# Patient Record
Sex: FEMALE | Race: WHITE | NOT HISPANIC OR LATINO | URBAN - METROPOLITAN AREA
[De-identification: names, ages, dates, MRNs, and addresses within clinical notes are randomized per-mention and may not be internally consistent; named-entity substitution may affect disease eponyms.]

---

## 2019-03-03 ENCOUNTER — INPATIENT (INPATIENT)
Facility: HOSPITAL | Age: 24
LOS: 0 days | Discharge: LEFT BEFORE DISCHARGE | DRG: 872 | End: 2019-03-04
Attending: INTERNAL MEDICINE | Admitting: INTERNAL MEDICINE
Payer: COMMERCIAL

## 2019-03-03 VITALS
WEIGHT: 126.1 LBS | HEIGHT: 65 IN | OXYGEN SATURATION: 100 % | HEART RATE: 136 BPM | SYSTOLIC BLOOD PRESSURE: 126 MMHG | TEMPERATURE: 99 F | DIASTOLIC BLOOD PRESSURE: 74 MMHG | RESPIRATION RATE: 16 BRPM

## 2019-03-03 DIAGNOSIS — Z98.890 OTHER SPECIFIED POSTPROCEDURAL STATES: Chronic | ICD-10-CM

## 2019-03-03 LAB
ACETONE SERPL-MCNC: NEGATIVE — SIGNIFICANT CHANGE UP
ALBUMIN SERPL ELPH-MCNC: 3.5 G/DL — SIGNIFICANT CHANGE UP (ref 3.5–5)
ALP SERPL-CCNC: 68 U/L — SIGNIFICANT CHANGE UP (ref 40–120)
ALT FLD-CCNC: 27 U/L DA — SIGNIFICANT CHANGE UP (ref 10–60)
ANION GAP SERPL CALC-SCNC: 8 MMOL/L — SIGNIFICANT CHANGE UP (ref 5–17)
APPEARANCE UR: CLEAR — SIGNIFICANT CHANGE UP
AST SERPL-CCNC: 17 U/L — SIGNIFICANT CHANGE UP (ref 10–40)
BASOPHILS # BLD AUTO: 0.04 K/UL — SIGNIFICANT CHANGE UP (ref 0–0.2)
BASOPHILS NFR BLD AUTO: 0.2 % — SIGNIFICANT CHANGE UP (ref 0–2)
BILIRUB SERPL-MCNC: 0.2 MG/DL — SIGNIFICANT CHANGE UP (ref 0.2–1.2)
BILIRUB UR-MCNC: NEGATIVE — SIGNIFICANT CHANGE UP
BUN SERPL-MCNC: 7 MG/DL — SIGNIFICANT CHANGE UP (ref 7–18)
CALCIUM SERPL-MCNC: 8.5 MG/DL — SIGNIFICANT CHANGE UP (ref 8.4–10.5)
CHLORIDE SERPL-SCNC: 110 MMOL/L — HIGH (ref 96–108)
CO2 SERPL-SCNC: 24 MMOL/L — SIGNIFICANT CHANGE UP (ref 22–31)
COLOR SPEC: YELLOW — SIGNIFICANT CHANGE UP
CREAT SERPL-MCNC: 0.65 MG/DL — SIGNIFICANT CHANGE UP (ref 0.5–1.3)
DIFF PNL FLD: NEGATIVE — SIGNIFICANT CHANGE UP
EOSINOPHIL # BLD AUTO: 0 K/UL — SIGNIFICANT CHANGE UP (ref 0–0.5)
EOSINOPHIL NFR BLD AUTO: 0 % — SIGNIFICANT CHANGE UP (ref 0–6)
ERYTHROCYTE [SEDIMENTATION RATE] IN BLOOD: 29 MM/HR — HIGH (ref 0–15)
GLUCOSE SERPL-MCNC: 117 MG/DL — HIGH (ref 70–99)
GLUCOSE UR QL: NEGATIVE — SIGNIFICANT CHANGE UP
HCG SERPL-ACNC: <1 MIU/ML — SIGNIFICANT CHANGE UP
HCT VFR BLD CALC: 36.9 % — SIGNIFICANT CHANGE UP (ref 34.5–45)
HGB BLD-MCNC: 11.9 G/DL — SIGNIFICANT CHANGE UP (ref 11.5–15.5)
IMM GRANULOCYTES NFR BLD AUTO: 0.7 % — SIGNIFICANT CHANGE UP (ref 0–1.5)
KETONES UR-MCNC: NEGATIVE — SIGNIFICANT CHANGE UP
LACTATE SERPL-SCNC: 1.8 MMOL/L — SIGNIFICANT CHANGE UP (ref 0.7–2)
LEUKOCYTE ESTERASE UR-ACNC: NEGATIVE — SIGNIFICANT CHANGE UP
LYMPHOCYTES # BLD AUTO: 1 K/UL — SIGNIFICANT CHANGE UP (ref 1–3.3)
LYMPHOCYTES # BLD AUTO: 4.9 % — LOW (ref 13–44)
MAGNESIUM SERPL-MCNC: 2.1 MG/DL — SIGNIFICANT CHANGE UP (ref 1.6–2.6)
MCHC RBC-ENTMCNC: 28.8 PG — SIGNIFICANT CHANGE UP (ref 27–34)
MCHC RBC-ENTMCNC: 32.2 GM/DL — SIGNIFICANT CHANGE UP (ref 32–36)
MCV RBC AUTO: 89.3 FL — SIGNIFICANT CHANGE UP (ref 80–100)
MONOCYTES # BLD AUTO: 0.48 K/UL — SIGNIFICANT CHANGE UP (ref 0–0.9)
MONOCYTES NFR BLD AUTO: 2.3 % — SIGNIFICANT CHANGE UP (ref 2–14)
NEUTROPHILS # BLD AUTO: 18.89 K/UL — HIGH (ref 1.8–7.4)
NEUTROPHILS NFR BLD AUTO: 91.9 % — HIGH (ref 43–77)
NITRITE UR-MCNC: NEGATIVE — SIGNIFICANT CHANGE UP
NRBC # BLD: 0 /100 WBCS — SIGNIFICANT CHANGE UP (ref 0–0)
PH UR: 6 — SIGNIFICANT CHANGE UP (ref 5–8)
PLATELET # BLD AUTO: 453 K/UL — HIGH (ref 150–400)
POTASSIUM SERPL-MCNC: 3.4 MMOL/L — LOW (ref 3.5–5.3)
POTASSIUM SERPL-SCNC: 3.4 MMOL/L — LOW (ref 3.5–5.3)
PROT SERPL-MCNC: 8.1 G/DL — SIGNIFICANT CHANGE UP (ref 6–8.3)
PROT UR-MCNC: NEGATIVE — SIGNIFICANT CHANGE UP
RBC # BLD: 4.13 M/UL — SIGNIFICANT CHANGE UP (ref 3.8–5.2)
RBC # FLD: 14.2 % — SIGNIFICANT CHANGE UP (ref 10.3–14.5)
SODIUM SERPL-SCNC: 142 MMOL/L — SIGNIFICANT CHANGE UP (ref 135–145)
SP GR SPEC: 1.01 — SIGNIFICANT CHANGE UP (ref 1.01–1.02)
T3 SERPL-MCNC: 98 NG/DL — SIGNIFICANT CHANGE UP (ref 80–200)
TSH SERPL-MCNC: 0.81 UU/ML — SIGNIFICANT CHANGE UP (ref 0.34–4.82)
UROBILINOGEN FLD QL: NEGATIVE — SIGNIFICANT CHANGE UP
WBC # BLD: 20.55 K/UL — HIGH (ref 3.8–10.5)
WBC # FLD AUTO: 20.55 K/UL — HIGH (ref 3.8–10.5)

## 2019-03-03 RX ORDER — KETOROLAC TROMETHAMINE 30 MG/ML
30 SYRINGE (ML) INJECTION ONCE
Qty: 0 | Refills: 0 | Status: DISCONTINUED | OUTPATIENT
Start: 2019-03-03 | End: 2019-03-03

## 2019-03-03 RX ORDER — SODIUM CHLORIDE 9 MG/ML
1800 INJECTION INTRAMUSCULAR; INTRAVENOUS; SUBCUTANEOUS ONCE
Qty: 0 | Refills: 0 | Status: COMPLETED | OUTPATIENT
Start: 2019-03-03 | End: 2019-03-03

## 2019-03-03 RX ORDER — ACETAMINOPHEN 500 MG
1000 TABLET ORAL ONCE
Qty: 0 | Refills: 0 | Status: COMPLETED | OUTPATIENT
Start: 2019-03-03 | End: 2019-03-04

## 2019-03-03 RX ORDER — PIPERACILLIN AND TAZOBACTAM 4; .5 G/20ML; G/20ML
3.38 INJECTION, POWDER, LYOPHILIZED, FOR SOLUTION INTRAVENOUS ONCE
Qty: 0 | Refills: 0 | Status: COMPLETED | OUTPATIENT
Start: 2019-03-03 | End: 2019-03-03

## 2019-03-03 RX ORDER — POTASSIUM CHLORIDE 20 MEQ
40 PACKET (EA) ORAL ONCE
Qty: 0 | Refills: 0 | Status: COMPLETED | OUTPATIENT
Start: 2019-03-03 | End: 2019-03-03

## 2019-03-03 RX ADMIN — PIPERACILLIN AND TAZOBACTAM 3.38 GRAM(S): 4; .5 INJECTION, POWDER, LYOPHILIZED, FOR SOLUTION INTRAVENOUS at 19:10

## 2019-03-03 RX ADMIN — Medication 30 MILLIGRAM(S): at 23:37

## 2019-03-03 RX ADMIN — PIPERACILLIN AND TAZOBACTAM 200 GRAM(S): 4; .5 INJECTION, POWDER, LYOPHILIZED, FOR SOLUTION INTRAVENOUS at 18:49

## 2019-03-03 RX ADMIN — Medication 0.5 MILLIGRAM(S): at 18:52

## 2019-03-03 RX ADMIN — SODIUM CHLORIDE 1800 MILLILITER(S): 9 INJECTION INTRAMUSCULAR; INTRAVENOUS; SUBCUTANEOUS at 20:12

## 2019-03-03 RX ADMIN — Medication 40 MILLIEQUIVALENT(S): at 21:14

## 2019-03-03 RX ADMIN — SODIUM CHLORIDE 3600 MILLILITER(S): 9 INJECTION INTRAMUSCULAR; INTRAVENOUS; SUBCUTANEOUS at 18:33

## 2019-03-03 NOTE — H&P ADULT - ASSESSMENT
Patient is a 22 y/o F with no PMH and PSH of volvulus and appendectomy was [facility] for sore throat, sepsis and tachycardia. As per patient she acquired sore throat, fever and chills x on february 15 and was test +ve for influenza and Strep throat antigen. Patient was given amoxicillin and Tamiflu. Patient did not take Tamiflu but completed her amoxicillin for 10 days. Patient's symptoms improved but later she developed another sore throat with in 3 days. [facility] went to [facility] again where her HR was 130s and his EKG had some T wave inversions. Patient was then sent in to ED. Patient is currently hemodynamically stable and is not in any distress. Vitals are significant for  /74. Patient received 2 L of IV fluids and a dose of zosyn. Her RVP is negative. UA is clean. D-Dimers are negative. EKG doesn't have any ST-T wave changes except sinus tachycardia. CXR is clear. Her LMP was Feb 13 2019.

## 2019-03-03 NOTE — H&P ADULT - ATTENDING COMMENTS
Patient seen and examined ; case was discussed with the admitting resident    ROS: as in the HPI; all other ROS negative    SH and family history as above    Vital Signs Last 24 Hrs  T(C): 36.7 (03 Mar 2019 23:45), Max: 37.4 (03 Mar 2019 22:40)  T(F): 98 (03 Mar 2019 23:45), Max: 99.4 (03 Mar 2019 22:40)  HR: 124 (03 Mar 2019 23:45) (124 - 136)  BP: 119/63 (03 Mar 2019 23:45) (119/63 - 126/74)  BP(mean): --  RR: 17 (03 Mar 2019 23:45) (16 - 17)  SpO2: 100% (03 Mar 2019 23:45) (99% - 100%)    GEN: NAD  HEENT- normocephalic; mouth moist  CVS- S1S2+  LUNGS- clear to auscultation; no wheezing  ABD: Soft , nontender, nondistended, Bowel sounds are present  EXTREMITY: no calf tenderness, no cyanosis, no edema  NEURO: AAOx3; non focal neurologic exam; cranial nerves grossly intact  PSYCH: normal affect and behavior  BACK: no swelling or mass;   VASCULAR: ++ distal peripheral pulses  SKIN: warm and dry.       Labs Reviewed:                         11.9   20.55 )-----------( 453      ( 03 Mar 2019 18:48 )             36.9     -    142  |  110<H>  |  7   ----------------------------<  117<H>  3.4<L>   |  24  |  0.65    Ca    8.5      03 Mar 2019 18:48  Mg     2.1     -    TPro  8.1  /  Alb  3.5  /  TBili  0.2  /  DBili  x   /  AST  17  /  ALT  27  /  AlkPhos  68          Urinalysis Basic - ( 03 Mar 2019 20:26 )    Color: Yellow / Appearance: Clear / S.010 / pH: x  Gluc: x / Ketone: Negative  / Bili: Negative / Urobili: Negative   Blood: x / Protein: Negative / Nitrite: Negative   Leuk Esterase: Negative / RBC: x / WBC x   Sq Epi: x / Non Sq Epi: x / Bacteria: x        BNP:   MEDICATIONS  (STANDING):  acetaminophen  IVPB .. 1000 milliGRAM(s) IV Intermittent once    MEDICATIONS  (PRN):      CXR reviewed:     EKG Reviewed:         Full H&P to follow      Plan of care discussed with patient ;  all questions and concerns were addressed.  Discussed with Patient's family Patient seen and examined ; case was discussed with the admitting resident    ROS: as in the HPI; all other ROS negative    SH and family history as above    Vital Signs Last 24 Hrs  T(C): 36.7 (03 Mar 2019 23:45), Max: 37.4 (03 Mar 2019 22:40)  T(F): 98 (03 Mar 2019 23:45), Max: 99.4 (03 Mar 2019 22:40)  HR: 124 (03 Mar 2019 23:45) (124 - 136)  BP: 119/63 (03 Mar 2019 23:45) (119/63 - 126/74)  BP(mean): --  RR: 17 (03 Mar 2019 23:45) (16 - 17)  SpO2: 100% (03 Mar 2019 23:45) (99% - 100%)    GEN: mildly anxious   HEENT- normocephalic; mouth moist, some petechiae over soft palate, no exudates or abscess appreciated   CVS- S1S2+, tachycardia   LUNGS- clear to auscultation; no wheezing  ABD: Soft , nontender, nondistended, Bowel sounds are present  EXTREMITY: no calf tenderness, no cyanosis, no edema  NEURO: AAOx3; non focal neurologic exam; cranial nerves grossly intact  PSYCH: normal affect and behavior  BACK: no swelling or mass;   VASCULAR: ++ distal peripheral pulses  SKIN: warm and dry, multiple excoriations over b/l hands, eczema, excoriations       Labs Reviewed:                         11.   20.55 )-----------( 453      ( 03 Mar 2019 18:48 )             36.9     -    142  |  110<H>  |  7   ----------------------------<  117<H>  3.4<L>   |  24  |  0.65    Ca    8.5      03 Mar 2019 18:48  Mg     2.1     -    TPro  8.1  /  Alb  3.5  /  TBili  0.2  /  DBili  x   /  AST  17  /  ALT  27  /  AlkPhos  68  03-    Urinalysis Basic - ( 03 Mar 2019 20:26 )    Color: Yellow / Appearance: Clear / S.010 / pH: x  Gluc: x / Ketone: Negative  / Bili: Negative / Urobili: Negative   Blood: x / Protein: Negative / Nitrite: Negative   Leuk Esterase: Negative / RBC: x / WBC x   Sq Epi: x / Non Sq Epi: x / Bacteria: x    BNP:   MEDICATIONS  (STANDING):  acetaminophen  IVPB .. 1000 milliGRAM(s) IV Intermittent once  CXR reviewed    EKG Reviewed    24 y/o F with recent diagnosed GAS pharyngitis and influenza admitted with chest pain and tachycardia and meeting sepsis criteria.   1.URI vs recurrent GAS pharyngitis- Patient’s symptoms of throat pain have resolved, flu swab here negative, will check GAS culture and try Rocephin in the event of treatment failure/recurrent GAS, although her clinical symptoms are less consistent given lack of adenopathy fever, tonsillar exudates. No evidence of retropharyngeal abscess clinically.   2. Sepsis- leukocytosis, tachycardia- s/p treatment for GAS with 10 day course, flu swab negative here, trial of rocephin for now repeat labs and monitor, IVF. Lactate WNL.   3. Chest pain, tachycardia- suspect 2/2 URI vs viral/GAS pharyngitis- d dimer negative, troponin negative, patient on telemetry, give IVF, pain control. No clinical evidence of acute rhematic fever or pericarditis (chest pain resolved and nonpositional EKG features not c/w pericarditis), GAS myocarditis less likely as well. If symptoms persist can check echo.   4. Acute anxiety     Plan of care discussed with patient ;  all questions and concerns were addressed.

## 2019-03-03 NOTE — ED PROVIDER NOTE - CONSTITUTIONAL APPEARANCE HYGIENE, MLM
Preventive Health Recommendations  Female Ages 40 to 49    Yearly exam:     See your health care provider every year in order to  1. Review health changes.   2. Discuss preventive care.    3. Review your medicines if your doctor prescribed any.      Get a Pap test every three years (unless you have an abnormal result and your provider advises testing more often).      If you get Pap tests with HPV test, you only need to test every 5 years, unless you have an abnormal result. You do not need a Pap test if your uterus was removed (hysterectomy) and you have not had cancer.      You should be tested each year for STDs (sexually transmitted diseases), if you're at risk.       Ask your doctor if you should have a mammogram.      Have a colonoscopy (test for colon cancer) if someone in your family has had colon cancer or polyps before age 50.       Have a cholesterol test every 5 years.       Have a diabetes test (fasting glucose) after age 45. If you are at risk for diabetes, you should have this test every 3 years.    Shots: Get a flu shot each year. Get a tetanus shot every 10 years.     Nutrition:     Eat at least 5 servings of fruits and vegetables each day.    Eat whole-grain bread, whole-wheat pasta and brown rice instead of white grains and rice.    Talk to your provider about Calcium and Vitamin D.     Lifestyle    Exercise at least 150 minutes a week (an average of 30 minutes a day, 5 days a week). This will help you control your weight and prevent disease.    Limit alcohol to one drink per day.    No smoking.     Wear sunscreen to prevent skin cancer.    See your dentist every six months for an exam and cleaning.  
good hygiene

## 2019-03-03 NOTE — ED PROVIDER NOTE - PROGRESS NOTE DETAILS
pt continues with tachycardia despite afebrile, concern for other infectious pathology, will admit pt.  D/W Dr. Adams

## 2019-03-03 NOTE — ED PROVIDER NOTE - ENMT, MLM
Airway patent, Nasal mucosa clear. Mouth with normal mucosa. Throat has no vesicles, no oropharyngeal exudates and uvula is midline. rylee pharynx-erythematous, sinus-NT to percussion

## 2019-03-03 NOTE — ED PROVIDER NOTE - OBJECTIVE STATEMENT
22 y/o F with PSHx of abdominal surgery presents to the ED from [facility] with complaints of throat pain x 3 days. Patient states she completed 10 day course of Augmentin last week for strep pharyngitis, however developed recurrent throat pain. Patient was seen at [facility] today at which time had palpitations and chest pain, thus advised to present to the ED. Patient had reported negative CXR done at that time. Patient took 3 doses of Tamiflu since onset of symptoms, however has not taken any medications today. Patient describes chest pain as pressure sensation. Reports associated weakness and feeling of anxiety. Patient reports gargling with Hydrogen Peroxide and taking Vitamin C. Patient denies fever, body aches, nausea, vomiting, cough, rhinorrhea, recent travel, OCP use or any other acute complaints. NKDA. LMP: 2 weeks ago.

## 2019-03-03 NOTE — H&P ADULT - PROBLEM SELECTOR PLAN 1
-pt meets SIRS criteria tachycardia >100 and wbc count >12k  -Likely 2/2 to viral etiology or persistant/recurrent GAS infection. Unlikely develop complications of GAS infection like peritonsillar abscess.   -will start on Rocephin   -f/u Strep throat antigen, mono spot   -RVP negative  -s/p 3 L IV fluids, tachycardia has now trended down to 90  -Unlikely PE, given absence of hypoxia, negative troponin, negative D-Dimers  -c/w IV hydration and supportive treatment

## 2019-03-03 NOTE — H&P ADULT - NSHPPHYSICALEXAM_GEN_ALL_CORE
PHYSICAL EXAM:  GENERAL: NAD  HEENT: Normocephalic;  conjunctivae and sclerae clear; moist mucous membranes;   NECK : supple  CHEST/LUNG: Clear to auscultation bilaterally with good air entry   HEART: S1 S2  regular; no murmurs, gallops or rubs  ABDOMEN: Soft, Nontender, Nondistended; Bowel sounds present  EXTREMITIES: no cyanosis; no edema; no calf tenderness  SKIN: warm and dry; no rash  NERVOUS SYSTEM:  Awake and alert; Oriented  to place, person and time ; no new deficits PHYSICAL EXAM:  GENERAL: NAD  HEENT: Normocephalic;  conjunctivae and sclerae clear; moist mucous membranes; no tonsillar exudates   NECK : supple, no palpable lymphadenopathy   CHEST/LUNG: Clear to auscultation bilaterally with good air entry   HEART: S1 S2  regular; no murmurs, gallops or rubs  ABDOMEN: Soft, Nontender, Nondistended; Bowel sounds present  EXTREMITIES: no cyanosis; no edema; no calf tenderness  SKIN: warm and dry; no rash  NERVOUS SYSTEM:  Awake and alert; Oriented  to place, person and time ; no new deficits

## 2019-03-03 NOTE — ED PROVIDER NOTE - CARE PLAN
Principal Discharge DX:	Sepsis  Secondary Diagnosis:	Tachycardia  Secondary Diagnosis:	Acute pharyngitis

## 2019-03-03 NOTE — H&P ADULT - HISTORY OF PRESENT ILLNESS
Patient is a 24 y/o F with no PMH and PSH of volvulus and appendectomy was [facility] for sore throat, sepsis and tachycardia. As per patient she acquired sore throat, fever and chills x on february 15 and was test +ve for influenza and Strep throat antigen. Patient was given amoxicillin and Tamiflu. Patient did not take Tamiflu but completed her amoxicillin for 10 days. Patient's symptoms improved but later she developed another sore throat with in 3 days. [facility] to [facility] where her HR was 130s and his EKG had some T wave inversions. Patient was then sent in to ED. Patient is currently hemodynamically stable and is not in any distress. Vitals are significant for  /74. Patient received 2 L of IV fluids and a dose of zosyn. Her RVP is negative. UA is clean. D-Dimers are negative. EKG doesn't have any ST-T wave changes except sinus tachycardia. CXR is clear. Her LMP was Feb 13 2019.

## 2019-03-04 ENCOUNTER — TRANSCRIPTION ENCOUNTER (OUTPATIENT)
Age: 24
End: 2019-03-04

## 2019-03-04 VITALS
HEART RATE: 111 BPM | OXYGEN SATURATION: 100 % | DIASTOLIC BLOOD PRESSURE: 71 MMHG | RESPIRATION RATE: 16 BRPM | TEMPERATURE: 98 F | SYSTOLIC BLOOD PRESSURE: 131 MMHG

## 2019-03-04 DIAGNOSIS — K56.2 VOLVULUS: Chronic | ICD-10-CM

## 2019-03-04 DIAGNOSIS — Z29.9 ENCOUNTER FOR PROPHYLACTIC MEASURES, UNSPECIFIED: ICD-10-CM

## 2019-03-04 DIAGNOSIS — A41.9 SEPSIS, UNSPECIFIED ORGANISM: ICD-10-CM

## 2019-03-04 DIAGNOSIS — E87.6 HYPOKALEMIA: ICD-10-CM

## 2019-03-04 LAB
ALBUMIN SERPL ELPH-MCNC: 2.6 G/DL — LOW (ref 3.5–5)
ALP SERPL-CCNC: 57 U/L — SIGNIFICANT CHANGE UP (ref 40–120)
ALT FLD-CCNC: 22 U/L DA — SIGNIFICANT CHANGE UP (ref 10–60)
ANION GAP SERPL CALC-SCNC: 4 MMOL/L — LOW (ref 5–17)
AST SERPL-CCNC: 15 U/L — SIGNIFICANT CHANGE UP (ref 10–40)
BASOPHILS # BLD AUTO: 0.04 K/UL — SIGNIFICANT CHANGE UP (ref 0–0.2)
BASOPHILS NFR BLD AUTO: 0.3 % — SIGNIFICANT CHANGE UP (ref 0–2)
BILIRUB SERPL-MCNC: 0.3 MG/DL — SIGNIFICANT CHANGE UP (ref 0.2–1.2)
BUN SERPL-MCNC: 7 MG/DL — SIGNIFICANT CHANGE UP (ref 7–18)
CALCIUM SERPL-MCNC: 7.6 MG/DL — LOW (ref 8.4–10.5)
CHLORIDE SERPL-SCNC: 114 MMOL/L — HIGH (ref 96–108)
CK MB BLD-MCNC: <2.1 % — SIGNIFICANT CHANGE UP (ref 0–3.5)
CK MB CFR SERPL CALC: <1 NG/ML — SIGNIFICANT CHANGE UP (ref 0–3.6)
CK SERPL-CCNC: 47 U/L — SIGNIFICANT CHANGE UP (ref 21–215)
CK SERPL-CCNC: 50 U/L — SIGNIFICANT CHANGE UP (ref 21–215)
CO2 SERPL-SCNC: 25 MMOL/L — SIGNIFICANT CHANGE UP (ref 22–31)
CREAT SERPL-MCNC: 0.51 MG/DL — SIGNIFICANT CHANGE UP (ref 0.5–1.3)
D DIMER BLD IA.RAPID-MCNC: <150 NG/ML DDU — SIGNIFICANT CHANGE UP
D DIMER BLD IA.RAPID-MCNC: <150 NG/ML DDU — SIGNIFICANT CHANGE UP
EOSINOPHIL # BLD AUTO: 0.08 K/UL — SIGNIFICANT CHANGE UP (ref 0–0.5)
EOSINOPHIL NFR BLD AUTO: 0.5 % — SIGNIFICANT CHANGE UP (ref 0–6)
FLU A RESULT: SIGNIFICANT CHANGE UP
FLU A RESULT: SIGNIFICANT CHANGE UP
FLUAV AG NPH QL: SIGNIFICANT CHANGE UP
FLUBV AG NPH QL: SIGNIFICANT CHANGE UP
GLUCOSE SERPL-MCNC: 112 MG/DL — HIGH (ref 70–99)
HCT VFR BLD CALC: 32.3 % — LOW (ref 34.5–45)
HGB BLD-MCNC: 10.1 G/DL — LOW (ref 11.5–15.5)
IMM GRANULOCYTES NFR BLD AUTO: 0.5 % — SIGNIFICANT CHANGE UP (ref 0–1.5)
LYMPHOCYTES # BLD AUTO: 18.4 % — SIGNIFICANT CHANGE UP (ref 13–44)
LYMPHOCYTES # BLD AUTO: 2.71 K/UL — SIGNIFICANT CHANGE UP (ref 1–3.3)
MAGNESIUM SERPL-MCNC: 2.2 MG/DL — SIGNIFICANT CHANGE UP (ref 1.6–2.6)
MCHC RBC-ENTMCNC: 28.5 PG — SIGNIFICANT CHANGE UP (ref 27–34)
MCHC RBC-ENTMCNC: 31.3 GM/DL — LOW (ref 32–36)
MCV RBC AUTO: 91.2 FL — SIGNIFICANT CHANGE UP (ref 80–100)
MONOCYTES # BLD AUTO: 1 K/UL — HIGH (ref 0–0.9)
MONOCYTES NFR BLD AUTO: 6.8 % — SIGNIFICANT CHANGE UP (ref 2–14)
NEUTROPHILS # BLD AUTO: 10.83 K/UL — HIGH (ref 1.8–7.4)
NEUTROPHILS NFR BLD AUTO: 73.5 % — SIGNIFICANT CHANGE UP (ref 43–77)
NRBC # BLD: 0 /100 WBCS — SIGNIFICANT CHANGE UP (ref 0–0)
PHOSPHATE SERPL-MCNC: 2.5 MG/DL — SIGNIFICANT CHANGE UP (ref 2.5–4.5)
PLATELET # BLD AUTO: 395 K/UL — SIGNIFICANT CHANGE UP (ref 150–400)
POTASSIUM SERPL-MCNC: 4.1 MMOL/L — SIGNIFICANT CHANGE UP (ref 3.5–5.3)
POTASSIUM SERPL-SCNC: 4.1 MMOL/L — SIGNIFICANT CHANGE UP (ref 3.5–5.3)
PROT SERPL-MCNC: 6.3 G/DL — SIGNIFICANT CHANGE UP (ref 6–8.3)
RBC # BLD: 3.54 M/UL — LOW (ref 3.8–5.2)
RBC # FLD: 14.3 % — SIGNIFICANT CHANGE UP (ref 10.3–14.5)
RSV RESULT: SIGNIFICANT CHANGE UP
RSV RNA RESP QL NAA+PROBE: SIGNIFICANT CHANGE UP
SODIUM SERPL-SCNC: 143 MMOL/L — SIGNIFICANT CHANGE UP (ref 135–145)
T4 AB SER-ACNC: 6.6 UG/DL — SIGNIFICANT CHANGE UP (ref 4.6–12)
TROPONIN I SERPL-MCNC: <0.015 NG/ML — SIGNIFICANT CHANGE UP (ref 0–0.04)
TROPONIN I SERPL-MCNC: <0.015 NG/ML — SIGNIFICANT CHANGE UP (ref 0–0.04)
WBC # BLD: 14.73 K/UL — HIGH (ref 3.8–10.5)
WBC # FLD AUTO: 14.73 K/UL — HIGH (ref 3.8–10.5)

## 2019-03-04 PROCEDURE — 71046 X-RAY EXAM CHEST 2 VIEWS: CPT

## 2019-03-04 PROCEDURE — 93005 ELECTROCARDIOGRAM TRACING: CPT

## 2019-03-04 PROCEDURE — 83735 ASSAY OF MAGNESIUM: CPT

## 2019-03-04 PROCEDURE — 82009 KETONE BODYS QUAL: CPT

## 2019-03-04 PROCEDURE — 81003 URINALYSIS AUTO W/O SCOPE: CPT

## 2019-03-04 PROCEDURE — 99285 EMERGENCY DEPT VISIT HI MDM: CPT | Mod: 25

## 2019-03-04 PROCEDURE — 83605 ASSAY OF LACTIC ACID: CPT

## 2019-03-04 PROCEDURE — 84100 ASSAY OF PHOSPHORUS: CPT

## 2019-03-04 PROCEDURE — 87081 CULTURE SCREEN ONLY: CPT

## 2019-03-04 PROCEDURE — 87086 URINE CULTURE/COLONY COUNT: CPT

## 2019-03-04 PROCEDURE — 84702 CHORIONIC GONADOTROPIN TEST: CPT

## 2019-03-04 PROCEDURE — 85379 FIBRIN DEGRADATION QUANT: CPT

## 2019-03-04 PROCEDURE — 99285 EMERGENCY DEPT VISIT HI MDM: CPT

## 2019-03-04 PROCEDURE — 84436 ASSAY OF TOTAL THYROXINE: CPT

## 2019-03-04 PROCEDURE — 87040 BLOOD CULTURE FOR BACTERIA: CPT

## 2019-03-04 PROCEDURE — 87631 RESP VIRUS 3-5 TARGETS: CPT

## 2019-03-04 PROCEDURE — 82550 ASSAY OF CK (CPK): CPT

## 2019-03-04 PROCEDURE — 82553 CREATINE MB FRACTION: CPT

## 2019-03-04 PROCEDURE — 84443 ASSAY THYROID STIM HORMONE: CPT

## 2019-03-04 PROCEDURE — 36415 COLL VENOUS BLD VENIPUNCTURE: CPT

## 2019-03-04 PROCEDURE — 84484 ASSAY OF TROPONIN QUANT: CPT

## 2019-03-04 PROCEDURE — 86308 HETEROPHILE ANTIBODY SCREEN: CPT

## 2019-03-04 PROCEDURE — 85027 COMPLETE CBC AUTOMATED: CPT

## 2019-03-04 PROCEDURE — 80053 COMPREHEN METABOLIC PANEL: CPT

## 2019-03-04 PROCEDURE — 99223 1ST HOSP IP/OBS HIGH 75: CPT

## 2019-03-04 PROCEDURE — 85652 RBC SED RATE AUTOMATED: CPT

## 2019-03-04 PROCEDURE — 84480 ASSAY TRIIODOTHYRONINE (T3): CPT

## 2019-03-04 RX ORDER — CEFTRIAXONE 500 MG/1
1 INJECTION, POWDER, FOR SOLUTION INTRAMUSCULAR; INTRAVENOUS EVERY 24 HOURS
Qty: 0 | Refills: 0 | Status: DISCONTINUED | OUTPATIENT
Start: 2019-03-04 | End: 2019-03-04

## 2019-03-04 RX ORDER — POTASSIUM CHLORIDE 20 MEQ
40 PACKET (EA) ORAL
Qty: 0 | Refills: 0 | Status: DISCONTINUED | OUTPATIENT
Start: 2019-03-04 | End: 2019-03-04

## 2019-03-04 RX ORDER — SODIUM CHLORIDE 9 MG/ML
1000 INJECTION INTRAMUSCULAR; INTRAVENOUS; SUBCUTANEOUS
Qty: 0 | Refills: 0 | Status: DISCONTINUED | OUTPATIENT
Start: 2019-03-04 | End: 2019-03-04

## 2019-03-04 RX ORDER — BENZOCAINE AND MENTHOL 5; 1 G/100ML; G/100ML
1 LIQUID ORAL
Qty: 0 | Refills: 0 | Status: DISCONTINUED | OUTPATIENT
Start: 2019-03-04 | End: 2019-03-04

## 2019-03-04 RX ORDER — SODIUM CHLORIDE 9 MG/ML
1000 INJECTION INTRAMUSCULAR; INTRAVENOUS; SUBCUTANEOUS ONCE
Qty: 0 | Refills: 0 | Status: COMPLETED | OUTPATIENT
Start: 2019-03-04 | End: 2019-03-04

## 2019-03-04 RX ADMIN — SODIUM CHLORIDE 2000 MILLILITER(S): 9 INJECTION INTRAMUSCULAR; INTRAVENOUS; SUBCUTANEOUS at 03:10

## 2019-03-04 RX ADMIN — Medication 200 MILLIGRAM(S): at 03:38

## 2019-03-04 RX ADMIN — Medication 30 MILLIGRAM(S): at 00:03

## 2019-03-04 RX ADMIN — Medication 0.5 MILLIGRAM(S): at 06:25

## 2019-03-04 RX ADMIN — Medication 40 MILLIEQUIVALENT(S): at 02:51

## 2019-03-04 RX ADMIN — CEFTRIAXONE 100 GRAM(S): 500 INJECTION, POWDER, FOR SOLUTION INTRAMUSCULAR; INTRAVENOUS at 05:58

## 2019-03-04 RX ADMIN — Medication 200 MILLIGRAM(S): at 12:21

## 2019-03-04 RX ADMIN — SODIUM CHLORIDE 100 MILLILITER(S): 9 INJECTION INTRAMUSCULAR; INTRAVENOUS; SUBCUTANEOUS at 05:58

## 2019-03-04 RX ADMIN — Medication 400 MILLIGRAM(S): at 01:12

## 2019-03-04 RX ADMIN — Medication 1000 MILLIGRAM(S): at 02:03

## 2019-03-04 NOTE — DISCHARGE NOTE PROVIDER - NSDCCPCAREPLAN_GEN_ALL_CORE_FT
PRINCIPAL DISCHARGE DIAGNOSIS  Problem: Sepsis  Assessment and Plan of Treatment: you  were admitted to the hospital for rapid heart rate and high white count which is concerning for an Infection.  you were given IV fluids and IV antibiotics. we did testing for Mononucleosis and strep throat whisch is still in progress. we also want to do an Echo cardiogram of heart because of rapid heart rate, but want to leave against medical advise. it is srongly recommended to follow up outpt and arrange for Echocardiogram of your heart. you are advised to stay in the hospital and wait for the test results and continue treatment.      SECONDARY DISCHARGE DIAGNOSES  Problem: Tachycardia  Assessment and Plan of Treatment: -follow up outpt with PCP with 1 day    Problem: Acute pharyngitis  Assessment and Plan of Treatment:

## 2019-03-04 NOTE — ED ADULT NURSE REASSESSMENT NOTE - NS ED NURSE REASSESS COMMENT FT1
1410 pm - axox3 ,nad , stable v/s , sigh AMA - was explain about all complication - still sigh AMA - states will F/U with primary MD .left in stable condition .

## 2019-03-04 NOTE — DISCHARGE NOTE PROVIDER - HOSPITAL COURSE
Patient is a 22 y/o F with no PMH and PSH of volvulus and appendectomy was [facility] for sore throat, sepsis and tachycardia. As per patient she acquired sore throat, fever and chills x on february 15 and was test +ve for influenza and Strep throat antigen. Patient was given amoxicillin and Tamiflu. Patient did not take Tamiflu but completed her amoxicillin for 10 days. Patient's symptoms improved but later she developed another sore throat with in 3 days. [facility] where her HR was 130s and his EKG had some T wave inversions. Patient was then sent in to ED    -ED course- Vitals are significant for  /74. Patient received 2 L of IV fluids and a dose of zosyn. Her RVP is negative. UA is clean. D-Dimers are negative. low suspicion for PE EKG doesn't have any ST-T wave changes except sinus tachycardia. CXR is clear. Her LMP was Feb 13 2019.     ? viral etiology or persistant/ recurrent GAS infection.    -Mono and strep antigen in progress    -pt addiment about leaving the hospital. educated at length     about importance of waiting for test results and continuing empiric treatment in the meantime, pt verbalized understanding but persistent about leaving, pt signing out AMA

## 2019-03-04 NOTE — PROGRESS NOTE ADULT - SUBJECTIVE AND OBJECTIVE BOX
Patient is a 23y old  Female who presents with a chief complaint of sepsis (03 Mar 2019 23:49)      INTERVAL HPI/OVERNIGHT EVENTS: afebrile, leukocytosis improving         REVIEW OF SYSTEMS:  CONSTITUTIONAL: No fever, chills  ENMT:  No difficulty hearing, no change in vision  NECK: No pain or stiffness  RESPIRATORY: No cough, SOB  CARDIOVASCULAR: No chest pain, palpitations  GASTROINTESTINAL: No abdominal pain. No nausea, vomiting, or diarrhea  GENITOURINARY: No dysuria  NEUROLOGICAL: No HA  MUSCULOSKELETAL: No joint pain or swelling; No muscle, back, or extremity pain    T(C): 37.1 (19 @ 11:25), Max: 37.4 (19 @ 22:40)  HR: 110 (19 @ 11:25) (94 - 136)  BP: 123/63 (19 @ 11:25) (104/73 - 126/74)  RR: 16 (19 @ 11:25) (16 - 17)  SpO2: 100% (19 @ 11:25) (99% - 100%)  Wt(kg): --Vital Signs Last 24 Hrs  T(C): 37.1 (04 Mar 2019 11:25), Max: 37.4 (03 Mar 2019 22:40)  T(F): 98.8 (04 Mar 2019 11:25), Max: 99.4 (03 Mar 2019 22:40)  HR: 110 (04 Mar 2019 11:25) (94 - 136)  BP: 123/63 (04 Mar 2019 11:25) (104/73 - 126/74)  BP(mean): --  RR: 16 (04 Mar 2019 11:25) (16 - 17)  SpO2: 100% (04 Mar 2019 11:25) (99% - 100%)    MEDICATIONS  (STANDING):  cefTRIAXone   IVPB 1 Gram(s) IV Intermittent every 24 hours  guaiFENesin    Syrup 200 milliGRAM(s) Oral every 6 hours  sodium chloride 0.9%. 1000 milliLiter(s) (100 mL/Hr) IV Continuous <Continuous>    MEDICATIONS  (PRN):  benzocaine 15 mG/menthol 3.6 mG Lozenge 1 Lozenge Oral four times a day PRN Sore Throat      PHYSICAL EXAM:  GENERAL: NAD  EYES: clear conjunctiva  ENMT: Moist mucous membranes  NECK: Supple, No JVD  CHEST/LUNG: Clear to auscultation bilaterally; No rales, rhonchi, wheezing, or rubs  HEART: S1, S2, Regular rate and rhythm  ABDOMEN: Soft, Nontender, Nondistended; Bowel sounds present  NEURO: Alert & Oriented X3  EXTREMITIES: No LE edema, no calf tenderness  LYMPH: No lymphadenopathy noted      Consultant(s) Notes Reviewed:  [x ] YES  [ ] NO  Care Discussed with Consultants/Other Providers [ x] YES  [ ] NO    LABS:                        10.1   14.73 )-----------( 395      ( 04 Mar 2019 05:59 )             32.3     03-04    143  |  114<H>  |  7   ----------------------------<  112<H>  4.1   |  25  |  0.51    Ca    7.6<L>      04 Mar 2019 05:59  Phos  2.5     03-04  Mg     2.2     03-04    TPro  6.3  /  Alb  2.6<L>  /  TBili  0.3  /  DBili  x   /  AST  15  /  ALT  22  /  AlkPhos  57  03-04      Urinalysis Basic - ( 03 Mar 2019 20:26 )    Color: Yellow / Appearance: Clear / S.010 / pH: x  Gluc: x / Ketone: Negative  / Bili: Negative / Urobili: Negative   Blood: x / Protein: Negative / Nitrite: Negative   Leuk Esterase: Negative / RBC: x / WBC x   Sq Epi: x / Non Sq Epi: x / Bacteria: x      CAPILLARY BLOOD GLUCOSE            Urinalysis Basic - ( 03 Mar 2019 20:26 )    Color: Yellow / Appearance: Clear / S.010 / pH: x  Gluc: x / Ketone: Negative  / Bili: Negative / Urobili: Negative   Blood: x / Protein: Negative / Nitrite: Negative   Leuk Esterase: Negative / RBC: x / WBC x   Sq Epi: x / Non Sq Epi: x / Bacteria: x        RADIOLOGY & ADDITIONAL TESTS:    < from: Xray Chest 2 Views PA/Lat (19 @ 17:50) >    EXAM:  XR CHEST PA LAT 2V                            PROCEDURE DATE:  2019          INTERPRETATION:  CLINICAL INFORMATION: Sore throat. Palpitations    PA and lateral chest x-rays were performed.     Comparison:  None.    The mediastinal cardiac silhouette is normal.     The lungs are clear.     No acute osseous finding.    IMPRESSION:    No acute process.      < end of copied text >        Imaging Personally Reviewed:  [ ] YES  [ ] NO

## 2019-03-04 NOTE — CHART NOTE - NSCHARTNOTEFT_GEN_A_CORE
pt addiment about leaving the hospital  -Mono and strep antigen in progress  -leukocytosis and tachycardia ongoing  -Echo pending   pt educated at length re above and advised to wait for test result and Echo to be done, pt verbalized understanding of risks and asking to leave. Educated pt on following up with PCP immediately after discharge

## 2019-03-04 NOTE — PROGRESS NOTE ADULT - PROBLEM SELECTOR PLAN 1
- sent to ED for tachycardia  -s/p +ve strep and Flu on 2/15/19, completed 10 days of amoxicillin, pt did not take Tamiflu   -pt met SIRS criteria tachycardia and leukocytosis   -Likely 2/2 to viral etiology or persistant/ recurrent GAS infection.   -cont Rocephin   -f/u Strep throat antigen, mono spot- in progress  -RVP negative  -s/p 3 L IV fluids, tachycardia has now trended down to 100-110's   -Unlikely PE, given absence of hypoxia, negative troponin, negative D-Dimers  -cont IV hydration and supportive treatment - sent to ED for tachycardia  -s/p +ve strep and Flu on 2/15/19, completed 10 days of amoxicillin, pt did not take Tamiflu   -pt met SIRS criteria tachycardia and leukocytosis   -Likely 2/2 to viral etiology or persistant/ recurrent GAS infection.   -s/p 1 dose of zosyn   -cont Rocephin   -f/u Strep throat antigen, mono spot- in progress  -RVP negative  -s/p 2 L IV fluids, tachycardia has now trended down to 100-110's   -Unlikely PE, given absence of hypoxia, negative troponin, negative D-Dimers  -cont IV hydration and supportive treatment

## 2019-03-05 LAB
CULTURE RESULTS: NO GROWTH — SIGNIFICANT CHANGE UP
HETEROPH AB TITR SER AGGL: NEGATIVE — SIGNIFICANT CHANGE UP
SPECIMEN SOURCE: SIGNIFICANT CHANGE UP

## 2019-03-06 LAB
CULTURE RESULTS: SIGNIFICANT CHANGE UP
SPECIMEN SOURCE: SIGNIFICANT CHANGE UP

## 2019-03-08 LAB
CULTURE RESULTS: SIGNIFICANT CHANGE UP
CULTURE RESULTS: SIGNIFICANT CHANGE UP
SPECIMEN SOURCE: SIGNIFICANT CHANGE UP
SPECIMEN SOURCE: SIGNIFICANT CHANGE UP

## 2021-06-09 NOTE — ED ADULT NURSE NOTE - CAS EDN INTEG ASSESS
Stress test & Sleep Study results discussed with Dr. Rose who would like pt to start Flecainide 100mg BID and to have Cardioversion scheduled at least 2 weeks after pt has started medication. RX for Flecainide sent to pt's pharmacy.  Pt notified of stress test results and advised to start Flecainide 100 mg BID and informed pt someone will call her to schedule a Cardioversion at [facility].   Pt also notified of sleep study results and advised to follow up with the Sleep Medicine clinic. Pt advised to call [phone] to make an appointment. Pt verbalizes understanding of instructions and has no further questions at this time.    WDL

## 2022-05-05 NOTE — ED ADULT NURSE NOTE - CHIEF COMPLAINT
Pt already scheduled     Encounter Information    Encounter Information    Provider Department Encounter # Center   5/24/2022 2:50 PM Zhang MD [facility] [id number] [facility]       Display Notes    NPT- PCP- Shay Farah- Proteinuria, unspecified type [R80.9]  - Primary         The patient is a 23y Female complaining of see chief complaint quote.

## 2022-05-06 NOTE — ED PROVIDER NOTE - NS ED SCRIBE STATEMENT
Name: Anna Marie Villanueva  : 1951  MRN: [id number]    Oncology Navigation Follow-Up Note    Contact Type:  Telephone  Notes: Upon review of chart noted pt canceled today's Dr. Biju Manning consult. Pt no show to 1 consult & canceled 3 consults. Navigation dc'd. Dr. Biju Manning updated.       Electronically signed by Karolina Vora RN on 2022 at 8:51 AM Attending

## 2023-02-15 NOTE — ED PROVIDER NOTE - CLINICAL SUMMARY MEDICAL DECISION MAKING FREE TEXT BOX
[Mother] : mother [] :  [[employer] ] : provided by [employer]   [Interpreters_IDNumber] : [id number] Patient with recurrent strep; completed treatment one week ago. Concern for medication resistance. Patient is also tachycardic and appears anxious. Will obtain labs including thyroid. Will give antibiotic and Ativan. Reassess.

## 2023-03-14 NOTE — ED PROVIDER NOTE - MDM ORDERS SUBMITTED SELECTION
Labs/Medications Information: Selecting Yes will display possible errors in your note based on the variables you have selected. This validation is only offered as a suggestion for you. PLEASE NOTE THAT THE VALIDATION TEXT WILL BE REMOVED WHEN YOU FINALIZE YOUR NOTE. IF YOU WANT TO FAX A PRELIMINARY NOTE YOU WILL NEED TO TOGGLE THIS TO 'NO' IF YOU DO NOT WANT IT IN YOUR FAXED NOTE. Labs/Medications/EKG/Imaging Studies
